# Patient Record
Sex: MALE | Race: ASIAN | NOT HISPANIC OR LATINO
[De-identification: names, ages, dates, MRNs, and addresses within clinical notes are randomized per-mention and may not be internally consistent; named-entity substitution may affect disease eponyms.]

---

## 2022-01-26 PROBLEM — Z00.00 ENCOUNTER FOR PREVENTIVE HEALTH EXAMINATION: Status: ACTIVE | Noted: 2022-01-26

## 2022-01-27 ENCOUNTER — TRANSCRIPTION ENCOUNTER (OUTPATIENT)
Age: 52
End: 2022-01-27

## 2022-01-27 ENCOUNTER — APPOINTMENT (OUTPATIENT)
Dept: PEDIATRIC ORTHOPEDIC SURGERY | Facility: CLINIC | Age: 52
End: 2022-01-27
Payer: SELF-PAY

## 2022-01-27 VITALS — WEIGHT: 160 LBS | HEIGHT: 68 IN | BODY MASS INDEX: 24.25 KG/M2

## 2022-01-27 DIAGNOSIS — M77.8 OTHER ENTHESOPATHIES, NOT ELSEWHERE CLASSIFIED: ICD-10-CM

## 2022-01-27 DIAGNOSIS — M84.375A STRESS FRACTURE, LEFT FOOT, INITIAL ENCOUNTER FOR FRACTURE: ICD-10-CM

## 2022-01-27 PROCEDURE — 73630 X-RAY EXAM OF FOOT: CPT

## 2022-01-27 PROCEDURE — 73070 X-RAY EXAM OF ELBOW: CPT

## 2022-01-27 PROCEDURE — 99202 OFFICE O/P NEW SF 15 MIN: CPT

## 2022-01-27 RX ORDER — DICLOFENAC SODIUM 75 MG/1
75 TABLET, DELAYED RELEASE ORAL
Qty: 30 | Refills: 1 | Status: ACTIVE | COMMUNITY
Start: 2022-01-27 | End: 1900-01-01

## 2022-01-27 NOTE — HISTORY OF PRESENT ILLNESS
[de-identified] : This 51-year-old male is here for evaluation of a recent history of elbow and proximal forearm pain as well as left foot pain.  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  He runs approximately 100 miles per week.  He does not recall a specific injury.  He does serve in the [place].

## 2022-01-27 NOTE — ASSESSMENT
[FreeTextEntry1] : Left elbow tendinitis\par rule out stress fracture left second metatarsal\par \par This patient will be treated with anti-inflammatory medication and relative rest.  He has been placed on diclofenac.  If his foot remains painful he will be indicated for an MRI.\par \par Encounter time: 18 minutes

## 2022-01-27 NOTE — PHYSICAL EXAM
[de-identified] : On physical examination patient has tenderness in the anteromedial aspect of the left elbow.  Attempts at pronation of the forearm against resistance increases his pain.  There is no obvious swelling.  The neurovascular status of the left upper extremity is intact.  Examination of the left foot reveals tenderness in the region of the plantar and dorsal aspect of the left second metatarsal at the junction of the middle and distal thirds.  There is no obvious deformity.  There is no obvious swelling in this area. [de-identified] : X-ray evaluation of the left foot on 1/27/2022 (AP, lateral and oblique views) reveals no obvious abnormalities.\par Indication for foot x-ray: To determine presence of stress fracture or other bony abnormality\par \par X-ray evaluation of the elbow on 1/27/2022 (AP and lateral views) reveals no obvious abnormalities.\par Indication for elbow x-ray: To determine presence of fracture or bony abnormality